# Patient Record
(demographics unavailable — no encounter records)

---

## 2017-11-17 NOTE — HP
COWS





- Scale


Resting Pulse: 0= ME 80 or Below


Sweatin=Flushed/Facial Moisture


Restless Observation: 3= Extraneous Movement


Pupil Size: 1= Pupils >than Normal


Bone or Joint Aches: 2= Severe Diffuse Aches


Runny Nose/ Eye Tearin= Runny Nose/Eyes


GI Upset > 30mins: 3= Vomiting/Diarrhea


Tremor Observation: 2= Slight Tremor Visible


Yawning Observation: 2= >3x During Session


Anxiety or Irritability: 2=Irritable/Anxious


Goose Flesh Skin: 3=Piloerection


COWS Score: 22





Admission [facility]





- [facility]


Chief Complaint: 





heroin withdrawal sx


Allergies/Adverse Reactions: 


 Allergies











Allergy/AdvReac Type Severity Reaction Status Date / Time


 


No Known Allergies Allergy   Verified 17 14:28











History of Present Illness: 





33 yo m with h/o heroin use disorder, sniffing 3 bundles daily, last used 

yesterday [place], wants to stop using reporting heroin/opioid 

withdrawal sx, nausea, vomiting, diarrhea, chills, body aches, no h/o seziures, 

no DTs, denies cocaine and alochol use, smokes 1 PPD.  no h/o suicide attempts 

no SI at this time.  Never been in treatment before. no /o OD.


Exam Limitations: No Limitations





- Ebola screening


Have you traveled outside of the country in the last 21 days: No


Have you had contact with anyone from an Ebola affected area: No


Have you been sick,other than usual withdrawal symptoms: No


Do you have a fever: No





- Review of Systems


Constitutional: Chills, Diaphoresis, Loss of Appetite, Malaise, Night Sweats, 

Changes in sleep, Unintentional Wgt. Loss


EENT: reports: Nose Congestion


Respiratory: reports: No Symptoms reported


Cardiac: reports: No Symptoms Reported


GI: reports: Diarrhea, Nausea, Poor Appetite, Poor Fluid Intake, Vomiting, 

Abdominal cramping


: reports: No Symptoms Reported


Musculoskeletal: reports: Back Pain, Joint Pain, Muscle Pain, Neck Pain


Integumentary: reports: Flushing, Sweating


Neuro: reports: Headache, Tremors, Weakness


Endocrine: reports: No Symptoms Reported


Hematology: reports: No Symptoms Reported


Psychiatric: reports: Judgement Intact, Mood/Affect Appropiate, Orientated x3, 

Anxious, Depressed


Other Systems: Reviewed and Negative





Patient History





- Patient Medical History


Hx Anemia: No


Hx Asthma: No


Hx Chronic Obstructive Pulmonary Disease (COPD): No


Hx Cancer: No


Hx Cardiac Disorders: No


Hx Congestive Heart Failure: No


Hx Hypertension: No


Hx Hypercholesterolemia: No


Hx Pacemaker: No


HX Cerebrovascular Accident: No


Hx Seizures: No


Hx Dementia: No


Hx Diabetes: No


Hx Gastrointestinal Disorders: No


Hx Liver Disease: No


Hx Genitourinary Disorders: No


Hx Sexually Transmitted Disorders: No


Hx Renal Disease (ESRD): No


Hx Thyroid Disease: No


Hx Human Immunodeficiency Virus (HIV): No


Hx Hepatitis C: No


Hx Depression: Yes


Hx Suicide Attempt: No


Hx Bipolar Disorder: No


Hx Schizophrenia: No


Other Medical History: anxiety and insomnia





- Patient Surgical History


Past Surgical History: No


Anesthesia Reaction: No





- PPD History


Previous Implant?: Yes


Documented Results: Negative w/o proof


Implanted On Prior [facility] Admission?: No


PPD to be Administered?: Yes





- Reproductive History


Patient is a Female of Child Bearing Age (11 -55 yrs old): No


Patient Pregnant: No





- Smoking Cessation


Smoking history: Current every day smoker


Aproximately how many cigarettes per day: 20


Hx Chewing Tobacco Use: No


Initiated information on smoking cessation: Yes


'Breaking Loose' booklet given: 17





- Substance & Tx. History


Hx Alcohol Use: No


Hx Substance Use: Yes


Substance Use Type: Heroin


Hx Substance Use Treatment: No (first admission for detox)





- Substances Abused


  ** Heroin


Route: Inhalation


Frequency: Daily


Amount used: 30 bags


Age of first use: 30


Date of Last Use: 17





Family Disease History





- Family Disease History


Family History: Denies


Other Family History: aunt and uncle use heroin and alcohol





Admission Physical Exam [facility]





- Vital Signs


Vital Signs: 


 Vital Signs - 24 hr











  17





  13:18


 


Temperature 98.1 F


 


Pulse Rate 66


 


Respiratory 18





Rate 


 


Blood Pressure 160/70














- Physical


General Appearance: Yes: Nourished, Appropriately Dressed, Disheveled, Mild 

Distress, Tremorous, Irritable, Sweating, Anxious


HEENTM: Yes: EOMI, Hearing grossly Normal, Normocephalic, Normal Voice, RIAN, 

Pharynx Normal, Nasal Congestion, Rhinorrhea


Respiratory: Yes: Within Normal Limits, Chest Non-Tender, Lungs Clear, Normal 

Breath Sounds, No Respiratory Distress, No Accessory Muscle Use


Neck: Yes: Within Normal Limits, No masses,lesions,Nodules, Supple, Trachea in 

good position


Breast: Yes: Breast Exam Deferred


Cardiology: Yes: Within Normal Limits, Regular Rhythm, Regular Rate, S1, S2


Abdominal: Yes: Within Normal Limits, Normal Bowel Sounds, Non Tender, Flat, 

Soft


Genitourinary: Yes: Within Normal Limits


Back: Yes: Within Normal Limits


Musculoskeletal: Yes: Back pain


Extremities: Yes: Normal Capillary Refill, Non-Tender, Tremors


Neurological: Yes: CNs II-XII NML intact, Fully Oriented, Alert, Motor Strength 

5/5, Normal Response, Depressed Affect


Integumentary: Yes: Normal Color, Warm, Diaphoresis, Moist, Other (poor skin 

tugor)


Lymphatic: Yes: Within Normal Limits





- Addiitonal


Findings: 





withdrawal sx and dehydration





- Diagnostic


(1) Nicotine dependence


Status: Acute   





(2) Opioid dependence with withdrawal


Status: Acute   





Cleared for Admission [facility]





- Detox or Rehab


[facility] Level of Care: Medically Managed


Detox Regimen/Protocol: Methadone





[facility] Breath Alcohol Content


Breath Alcohol Content: 0





Urine Drug Screen





- Results


Drug Screen Negative: No


Urine Drug Screen Results: JASON-Cocaine, OPI-Opiates

## 2017-11-18 NOTE — PN
[facility] Progress Note


Note: 





patient did not want to complete treatment,stated has emergency family problem,

did not want to wait,signed release ama

## 2017-11-18 NOTE — DS
[facility] Detox Discharge Summary


Admission Date: 


11/17/17





Discharge Date: 11/18/17





- History


Present History: Opioid Dependence


Additional Comments: 





patient did not want to complete treatment due to emergency family problem,did 

not want to wait,signed release ama


Pertinent Past History: 





nicotine dependence





- Physical Exam Results


Vital Signs: 


 Vital Signs











Temperature  97.5 F L  11/18/17 06:24


 


Pulse Rate  56 L  11/18/17 06:24


 


Respiratory Rate  18   11/18/17 06:24


 


Blood Pressure  112/64   11/18/17 06:24


 


O2 Sat by Pulse Oximetry (%)      











Pertinent Admission Physical Exam Findings: 





withdrawal symptom





- Medication


Discharge Medications: 


Ambulatory Orders





NK [No Known Home Medication]  11/17/17 











- Diagnosis


(1) Opioid dependence with withdrawal


Current Visit: Yes   Status: Acute   





(2) Nicotine dependence


Current Visit: Yes   Status: Acute   





- AMA


Did Patient Leave Against Medical Advice: Yes

## 2017-11-20 NOTE — EKG
Test Reason : 

Blood Pressure : ***/*** mmHG

Vent. Rate : 071 BPM     Atrial Rate : 071 BPM

   P-R Int : 158 ms          QRS Dur : 106 ms

    QT Int : 416 ms       P-R-T Axes : 052 061 042 degrees

   QTc Int : 452 ms

 

NORMAL SINUS RHYTHM

NORMAL ECG

NO PREVIOUS ECGS AVAILABLE

Confirmed by MEREDITH CHÁVEZ, SAMANTHA ([id number]) on 11/20/2017 9:41:51 AM

 

Referred By:             Confirmed By:SAMANTHA HARPER MD